# Patient Record
(demographics unavailable — no encounter records)

---

## 2025-05-27 NOTE — ASSESSMENT
[FreeTextEntry1] :  The patient is being seen today for her right hand. She was watching an unsteady patient in the bathroom while at work when the patient shoved the door and it hit her hand. It was not a crush injury, just the impact hurt her. 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 She has been taking Motrin and Tylenol. She is taking them OTC and takes 3 pills of Motrin every 8 hours when she does, taking the Tylenol at the [facility] point in between in dose. She is accompanied by a family member. She is not working. The splint she was in was removed today.  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 The natural history of the pathology was explained in full to the patient in layman's terms. I provided a right wrist cock-up brace for them to use to immobilize the wrist and wear while active. I recommend they rest and ice the wrist. Additionally, I recommend they take nsaid's to reduce any pain and inflammation, we discussed the appropriate dosage at length. I will send a prescription to her pharmacy today of Ibuprofen 600 mg.  She will follow up in 3-4 weeks for reevaluation.